# Patient Record
Sex: FEMALE
[De-identification: names, ages, dates, MRNs, and addresses within clinical notes are randomized per-mention and may not be internally consistent; named-entity substitution may affect disease eponyms.]

---

## 2020-09-24 ENCOUNTER — APPOINTMENT (OUTPATIENT)
Dept: UROLOGY | Facility: CLINIC | Age: 35
End: 2020-09-24
Payer: COMMERCIAL

## 2020-09-24 PROCEDURE — 99204 OFFICE O/P NEW MOD 45 MIN: CPT

## 2020-09-24 NOTE — HISTORY OF PRESENT ILLNESS
[FreeTextEntry1] : 36 yo with freq, urgency, difficutly voiding, pelvic pain\par \par Was treated for ureaplasm?  whch helped.  \par She is now c/o difficulty emptying\par nocturia 6-7\par mild leakage\par no dysuria \par hospitlaized as a child UTI? \par \par Pt has been having symptoms since ovarian cystecomy in 2012\par \par \par PMH:  heart murmur,\par PSH; ovarain cystecomy 2012\par FH:  dad CaP, no stones \par \par \par

## 2020-09-28 LAB — BACTERIA UR CULT: NORMAL

## 2020-10-07 LAB
MYCOPLASMA HOMINIS CULTURE: NORMAL
UREA SPECIMEN SOURCE: NORMAL
UREAPLASMA CULTURE: NORMAL
UREAPLASMA, PRELIMINARY CULTURE: NORMAL

## 2020-10-21 ENCOUNTER — TRANSCRIPTION ENCOUNTER (OUTPATIENT)
Age: 35
End: 2020-10-21

## 2020-10-21 ENCOUNTER — APPOINTMENT (OUTPATIENT)
Dept: UROLOGY | Facility: CLINIC | Age: 35
End: 2020-10-21
Payer: COMMERCIAL

## 2020-10-21 VITALS
WEIGHT: 138 LBS | DIASTOLIC BLOOD PRESSURE: 82 MMHG | TEMPERATURE: 98.6 F | SYSTOLIC BLOOD PRESSURE: 117 MMHG | HEART RATE: 89 BPM | BODY MASS INDEX: 24.45 KG/M2 | OXYGEN SATURATION: 99 % | HEIGHT: 63 IN

## 2020-10-21 DIAGNOSIS — Z00.00 ENCOUNTER FOR GENERAL ADULT MEDICAL EXAMINATION W/OUT ABNORMAL FINDINGS: ICD-10-CM

## 2020-10-21 PROCEDURE — 99072 ADDL SUPL MATRL&STAF TM PHE: CPT

## 2020-10-21 PROCEDURE — 81003 URINALYSIS AUTO W/O SCOPE: CPT | Mod: QW

## 2020-10-21 PROCEDURE — 51798 US URINE CAPACITY MEASURE: CPT

## 2020-10-21 PROCEDURE — 99214 OFFICE O/P EST MOD 30 MIN: CPT | Mod: 25

## 2020-10-22 NOTE — ASSESSMENT
[FreeTextEntry1] : 36 yo F with urinary frequency, nocturia and dyspareunia.   I think she will benefit from an evaluation by PT and she will make arrangements for this.  She will come back for follow-up in 6-8 weeks.

## 2020-10-22 NOTE — HISTORY OF PRESENT ILLNESS
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  She denies dysuria but still complains of urinary frequency and nocturia.  \par \par 20 urine culture/ Ureaplasma culture negative \par \par PMH:  heart murmur\par PSH; ovarian cystectomy \par FH:  dad CaP, no stones \par \par \par

## 2020-10-22 NOTE — PHYSICAL EXAM

## 2020-10-23 PROBLEM — Z00.00 ENCOUNTER FOR PREVENTIVE HEALTH EXAMINATION: Status: ACTIVE | Noted: 2020-09-11

## 2020-10-23 LAB
BILIRUB UR QL STRIP: NORMAL
CLARITY UR: CLEAR
COLLECTION METHOD: NORMAL
GLUCOSE UR-MCNC: NORMAL
HCG UR QL: 0.2 EU/DL
HGB UR QL STRIP.AUTO: NORMAL
KETONES UR-MCNC: NORMAL
LEUKOCYTE ESTERASE UR QL STRIP: NORMAL
NITRITE UR QL STRIP: NORMAL
PH UR STRIP: 8
PROT UR STRIP-MCNC: 30
SP GR UR STRIP: 1.02

## 2020-10-28 LAB — BACTERIA UR CULT: ABNORMAL

## 2020-10-28 RX ORDER — NITROFURANTOIN (MONOHYDRATE/MACROCRYSTALS) 25; 75 MG/1; MG/1
100 CAPSULE ORAL
Qty: 10 | Refills: 0 | Status: ACTIVE | COMMUNITY
Start: 2020-10-28 | End: 1900-01-01